# Patient Record
Sex: FEMALE | Race: WHITE | NOT HISPANIC OR LATINO
[De-identification: names, ages, dates, MRNs, and addresses within clinical notes are randomized per-mention and may not be internally consistent; named-entity substitution may affect disease eponyms.]

---

## 2020-08-31 PROBLEM — Z00.00 ENCOUNTER FOR PREVENTIVE HEALTH EXAMINATION: Status: ACTIVE | Noted: 2020-08-31

## 2020-09-09 ENCOUNTER — APPOINTMENT (OUTPATIENT)
Dept: NEUROSURGERY | Facility: CLINIC | Age: 85
End: 2020-09-09
Payer: MEDICARE

## 2020-09-09 VITALS
WEIGHT: 176 LBS | HEIGHT: 57 IN | BODY MASS INDEX: 37.97 KG/M2 | DIASTOLIC BLOOD PRESSURE: 67 MMHG | HEART RATE: 68 BPM | SYSTOLIC BLOOD PRESSURE: 168 MMHG

## 2020-09-09 VITALS — TEMPERATURE: 97.3 F

## 2020-09-09 PROCEDURE — 99203 OFFICE O/P NEW LOW 30 MIN: CPT

## 2020-09-09 RX ORDER — GABAPENTIN 300 MG/1
300 CAPSULE ORAL AT BEDTIME
Refills: 0 | Status: ACTIVE | COMMUNITY

## 2020-09-09 RX ORDER — MELOXICAM 15 MG/1
15 TABLET ORAL DAILY
Refills: 0 | Status: ACTIVE | COMMUNITY

## 2020-09-09 NOTE — ASSESSMENT
[FreeTextEntry1] : 87 year old female with low back pain and lumbar radicular pain not improved with PT and oral medication.  We will obtain MRI lumbar spine prior to any interventional procedure.  She will follow up with me once she has completed the study so that we may review the results and discuss her further treatment options.

## 2020-09-09 NOTE — HISTORY OF PRESENT ILLNESS
[Back] : back [___ yrs] : [unfilled] year(s) ago [10] : a maximum pain level of 10/10 [Sharp] : sharp [Right] : right [Buttocks] : buttocks [Bending] : bending [Sitting] : sitting [Standing] : standing [Ice] : ice [Gait Dysfunction] : gait dysfunction [PT] : PT [Medications] : medications [Injections] : injections [FreeTextEntry1] : pain worsening over the past 5-6 months [de-identified] : 5 [FreeTextEntry6] : Advil, Gabapentin, Meloxicam, had one injection (trigger point) which helped her for about 1 year

## 2020-09-09 NOTE — PHYSICAL EXAM
[General Appearance - Alert] : alert [Mood] : the mood was normal [Motor Strength] : muscle strength was normal in all four extremities [Sensation Tactile Decrease] : light touch was intact [2+] : Ankle jerk left 2+ [Straight-Leg Raise Test - Left] : straight leg raise of the left leg was negative [Able to heel walk] : the patient was able to heel walk [Able to toe walk] : the patient was able to toe walk [Straight-Leg Raise Test - Right] : straight leg raise  of the right leg was negative [No Spinal Tenderness] : no spinal tenderness [] : no rash

## 2020-09-11 RX ORDER — DIAZEPAM 5 MG/1
5 TABLET ORAL
Qty: 4 | Refills: 0 | Status: ACTIVE | COMMUNITY
Start: 2020-09-11 | End: 1900-01-01

## 2020-09-23 ENCOUNTER — APPOINTMENT (OUTPATIENT)
Dept: MRI IMAGING | Facility: CLINIC | Age: 85
End: 2020-09-23
Payer: MEDICARE

## 2020-09-23 ENCOUNTER — RESULT REVIEW (OUTPATIENT)
Age: 85
End: 2020-09-23

## 2020-09-23 ENCOUNTER — OUTPATIENT (OUTPATIENT)
Dept: OUTPATIENT SERVICES | Facility: HOSPITAL | Age: 85
LOS: 1 days | End: 2020-09-23
Payer: MEDICARE

## 2020-09-23 DIAGNOSIS — M51.36 OTHER INTERVERTEBRAL DISC DEGENERATION, LUMBAR REGION: ICD-10-CM

## 2020-09-23 DIAGNOSIS — M54.16 RADICULOPATHY, LUMBAR REGION: ICD-10-CM

## 2020-09-23 DIAGNOSIS — M54.5 LOW BACK PAIN: ICD-10-CM

## 2020-09-23 DIAGNOSIS — M48.061 SPINAL STENOSIS, LUMBAR REGION WITHOUT NEUROGENIC CLAUDICATION: ICD-10-CM

## 2020-09-23 PROCEDURE — 72148 MRI LUMBAR SPINE W/O DYE: CPT | Mod: 26

## 2020-09-30 ENCOUNTER — APPOINTMENT (OUTPATIENT)
Dept: NEUROSURGERY | Facility: CLINIC | Age: 85
End: 2020-09-30
Payer: MEDICARE

## 2020-09-30 VITALS
SYSTOLIC BLOOD PRESSURE: 169 MMHG | HEIGHT: 57 IN | WEIGHT: 176 LBS | HEART RATE: 74 BPM | DIASTOLIC BLOOD PRESSURE: 66 MMHG | BODY MASS INDEX: 37.97 KG/M2

## 2020-09-30 VITALS — TEMPERATURE: 97.8 F

## 2020-09-30 PROCEDURE — 99213 OFFICE O/P EST LOW 20 MIN: CPT

## 2020-09-30 NOTE — REASON FOR VISIT
[Follow-Up: _____] : a [unfilled] follow-up visit [FreeTextEntry1] : Patient returns after completing MRI lumbar spine.  She continues to have significant lower back pain without radiation down her legs.

## 2020-09-30 NOTE — DATA REVIEWED
[de-identified] : \par  MR Lumbar Spine No Cont             Final\par \par No Documents Attached\par \par \par \par \par   EXAM:  MR SPINE LUMBAR\par \par \par PROCEDURE DATE:  09/23/2020\par \par \par \par INTERPRETATION:  LUMBOSACRAL SPINE MRI\par \par CLINICAL INFORMATION: Low back pain and bilateral hip pain.\par TECHNIQUE: Multiplanar, multisequence MR imaging was obtained of the lumbosacral spine.\par FINDINGS:\par \par DISC LEVEL EVALUATION: Mild to moderate multilevel lower thoracic degenerative disc disease is present with mild disc bulging and osteophyte formation as well as disc degeneration.\par \par T12/L1: There is mild disc bulging and osteophyte formation with moderate facet arthrosis. Mild central canal stenosis is present with mild to moderate bilateral foraminal narrowing.\par L1/L2: There is mild disc height loss with mild to moderate circumferential disc bulging and osteophyte formation resulting in moderate to severe left and moderate right foraminal narrowing along with moderate central canal stenosis. There is moderate bilateral facet arthrosis.\par L2/L3: Trace anterolisthesis is noted of L3-L4 with moderate to severe facet arthrosis. There is mild disc bulging and osteophyte formation. The combination of these findings results in moderate to severe central canal stenosis with severe left and mild right foraminal narrowing.\par L3/L4: Mild to moderate circumferential disc bulging and osteophyte formation is present with moderate facet arthrosis resulting in moderate central canal stenosis with moderate to severe bilateral foraminal narrowing.\par L4/L5: Mild to moderate circumferential disc bulging and osteophyte formation is present with moderate facet arthrosis resulting in moderate to severe central canal stenosis with severe right and mild left foraminal narrowing.\par L5/S1: Severe bilateral facet arthrosis present with grade 1 anterolisthesis. There is uncovering of the posterior aspect of the disc along with mild to moderate disc bulging resulting in severe right and mild left foraminal narrowing. There is moderate to severe central canal stenosis.\par \par SPINAL ALIGNMENT: Mild to moderate scoliosis is present convex to the right.\par DISTAL CORD AND CONUS: The distal cord is normal in appearance. The conus terminates at L1 and is unremarkable.\par SI JOINTS: Mild degenerative changes is present.\par MARROW: Mild multilevel endplate marrow edema is present that is most prominent at L3-L4. There is no acute fracture. There is a mild to moderate chronic compression deformity of L1.\par PARASPINAL MUSCLE AND SOFT TISSUES: Fatty infiltration is noted of the lower lumbar paraspinal musculature\par INTRAABDOMINAL/INTRAPELVIC SOFT TISSUES: Normal\par \par IMPRESSION: Moderate to severe multilevel lumbar spondylosis with multilevel disc bulging, osteophyte formation and facet arthrosis resulting in advanced multilevel central canal and foraminal narrowing as detailed above.\par \par \par \par \par \par \par ANTONINA CALIXTO M.D., ATTENDING RADIOLOGIST Phone #: [phone]\par This document has been electronically signed. Sep 23 2020  2:19PM\par \par  \par \par  Ordered by: DIANA ADAM       Collected/Examined: 23Sep2020 11:52AM       \par Verified by: DIANA ADAM 24Sep2020 10:22AM       \par  Result Communication: Schedule appointment to discuss results;\par Stage: Final       \par  Performed at: [facility]       Resulted: 23Sep2020 02:22PM       Last Updated: 24Sep2020 10:22AM       Accession: [id number]

## 2020-09-30 NOTE — ASSESSMENT
[FreeTextEntry1] : 87 year old female with low back pain secondary to lumbar facet arthropathy.  Given the nature of the patient's complaints and lack of significant improvement following more conservative measures, we did discuss lumbar facet steroid injection.  Risks, benefits, and expectations of the procedure were reviewed.  The patient was provided with an educational pamphlet outlining the details of the procedure so that he/she may have the ability to review the information prior to proceeding.  The patient has agreed to proceed and will follow up with me for the procedure.\par

## 2020-10-05 ENCOUNTER — APPOINTMENT (OUTPATIENT)
Dept: DISASTER EMERGENCY | Facility: CLINIC | Age: 85
End: 2020-10-05

## 2020-10-08 DIAGNOSIS — Z01.818 ENCOUNTER FOR OTHER PREPROCEDURAL EXAMINATION: ICD-10-CM

## 2020-10-09 ENCOUNTER — APPOINTMENT (OUTPATIENT)
Dept: DISASTER EMERGENCY | Facility: CLINIC | Age: 85
End: 2020-10-09

## 2020-10-10 LAB — SARS-COV-2 N GENE NPH QL NAA+PROBE: NOT DETECTED

## 2020-10-11 ENCOUNTER — TRANSCRIPTION ENCOUNTER (OUTPATIENT)
Age: 85
End: 2020-10-11

## 2020-10-12 ENCOUNTER — OUTPATIENT (OUTPATIENT)
Dept: OUTPATIENT SERVICES | Facility: HOSPITAL | Age: 85
LOS: 1 days | End: 2020-10-12
Payer: MEDICARE

## 2020-10-12 ENCOUNTER — APPOINTMENT (OUTPATIENT)
Dept: NEUROSURGERY | Facility: CLINIC | Age: 85
End: 2020-10-12
Payer: MEDICARE

## 2020-10-12 DIAGNOSIS — M54.16 RADICULOPATHY, LUMBAR REGION: ICD-10-CM

## 2020-10-12 PROCEDURE — 64493 INJ PARAVERT F JNT L/S 1 LEV: CPT

## 2020-10-12 PROCEDURE — 64493 INJ PARAVERT F JNT L/S 1 LEV: CPT | Mod: 50

## 2020-10-12 PROCEDURE — 72148 MRI LUMBAR SPINE W/O DYE: CPT

## 2020-10-15 DIAGNOSIS — M47.816 SPONDYLOSIS WITHOUT MYELOPATHY OR RADICULOPATHY, LUMBAR REGION: ICD-10-CM

## 2020-10-27 ENCOUNTER — APPOINTMENT (OUTPATIENT)
Dept: NEUROSURGERY | Facility: CLINIC | Age: 85
End: 2020-10-27
Payer: MEDICARE

## 2020-10-27 VITALS
WEIGHT: 170 LBS | DIASTOLIC BLOOD PRESSURE: 69 MMHG | HEART RATE: 74 BPM | SYSTOLIC BLOOD PRESSURE: 146 MMHG | BODY MASS INDEX: 36.68 KG/M2 | HEIGHT: 57 IN

## 2020-10-27 PROCEDURE — 99213 OFFICE O/P EST LOW 20 MIN: CPT

## 2020-10-27 PROCEDURE — 99072 ADDL SUPL MATRL&STAF TM PHE: CPT

## 2020-10-27 NOTE — ASSESSMENT
[FreeTextEntry1] : 87 year old female with low back and lumbar facet arthropathy now moderately improved following her first facet injections.  She is agreeable to a repeat injection and will follow up with me next week for her procedure.

## 2020-10-27 NOTE — REASON FOR VISIT
[Follow-Up: _____] : a [unfilled] follow-up visit [FreeTextEntry1] : Patient returns after her first facet injections.  She feels 50% improved as compared to prior to her procedure.  She is here to discuss her progress.

## 2020-11-06 ENCOUNTER — APPOINTMENT (OUTPATIENT)
Dept: DISASTER EMERGENCY | Facility: CLINIC | Age: 85
End: 2020-11-06

## 2020-11-07 LAB — SARS-COV-2 N GENE NPH QL NAA+PROBE: NOT DETECTED

## 2020-11-08 ENCOUNTER — TRANSCRIPTION ENCOUNTER (OUTPATIENT)
Age: 85
End: 2020-11-08

## 2020-11-09 ENCOUNTER — APPOINTMENT (OUTPATIENT)
Dept: NEUROSURGERY | Facility: CLINIC | Age: 85
End: 2020-11-09
Payer: MEDICARE

## 2020-11-09 ENCOUNTER — OUTPATIENT (OUTPATIENT)
Dept: OUTPATIENT SERVICES | Facility: HOSPITAL | Age: 85
LOS: 1 days | End: 2020-11-09
Payer: MEDICARE

## 2020-11-09 DIAGNOSIS — M47.816 SPONDYLOSIS WITHOUT MYELOPATHY OR RADICULOPATHY, LUMBAR REGION: ICD-10-CM

## 2020-11-09 DIAGNOSIS — M54.16 RADICULOPATHY, LUMBAR REGION: ICD-10-CM

## 2020-11-09 PROCEDURE — 64493 INJ PARAVERT F JNT L/S 1 LEV: CPT

## 2020-11-09 PROCEDURE — 64493 INJ PARAVERT F JNT L/S 1 LEV: CPT | Mod: 50

## 2020-12-23 ENCOUNTER — APPOINTMENT (OUTPATIENT)
Dept: NEUROSURGERY | Facility: CLINIC | Age: 85
End: 2020-12-23
Payer: MEDICARE

## 2020-12-23 VITALS — TEMPERATURE: 96.7 F

## 2020-12-23 DIAGNOSIS — M54.5 LOW BACK PAIN: ICD-10-CM

## 2020-12-23 DIAGNOSIS — M54.16 RADICULOPATHY, LUMBAR REGION: ICD-10-CM

## 2020-12-23 DIAGNOSIS — M47.816 SPONDYLOSIS W/OUT MYELOPATHY OR RADICULOPATHY, LUMBAR REGION: ICD-10-CM

## 2020-12-23 DIAGNOSIS — G89.29 LOW BACK PAIN: ICD-10-CM

## 2020-12-23 DIAGNOSIS — M51.36 OTHER INTERVERTEBRAL DISC DEGENERATION, LUMBAR REGION: ICD-10-CM

## 2020-12-23 DIAGNOSIS — M48.061 SPINAL STENOSIS, LUMBAR REGION WITHOUT NEUROGENIC CLAUDICATION: ICD-10-CM

## 2020-12-23 PROCEDURE — 99213 OFFICE O/P EST LOW 20 MIN: CPT

## 2020-12-23 PROCEDURE — 99072 ADDL SUPL MATRL&STAF TM PHE: CPT

## 2020-12-23 NOTE — ASSESSMENT
[FreeTextEntry1] : 87 year old female with low back pain and lumbar facet arthropathy.  She is agreeable to bilateral L5-S1 medial branch blocks followed by radiofrequency ablation.  She will follow up with me in 2 weeks for her procedure.

## 2020-12-23 NOTE — REASON FOR VISIT
[Follow-Up: _____] : a [unfilled] follow-up visit [FreeTextEntry1] : Patient returns after her second facet injections over 1 month ago.  She unfortunately does not feel any additional relief from this procedure.  She continues to have significant lower back pain.  She is here to discuss.

## 2021-01-04 ENCOUNTER — APPOINTMENT (OUTPATIENT)
Dept: DISASTER EMERGENCY | Facility: CLINIC | Age: 86
End: 2021-01-04

## 2021-01-05 LAB — SARS-COV-2 N GENE NPH QL NAA+PROBE: DETECTED

## 2021-01-07 ENCOUNTER — APPOINTMENT (OUTPATIENT)
Dept: NEUROSURGERY | Facility: CLINIC | Age: 86
End: 2021-01-07

## 2021-01-21 ENCOUNTER — APPOINTMENT (OUTPATIENT)
Dept: NEUROSURGERY | Facility: CLINIC | Age: 86
End: 2021-01-21

## 2021-01-25 ENCOUNTER — APPOINTMENT (OUTPATIENT)
Dept: DISASTER EMERGENCY | Facility: CLINIC | Age: 86
End: 2021-01-25

## 2021-01-26 LAB — SARS-COV-2 N GENE NPH QL NAA+PROBE: DETECTED

## 2021-01-27 ENCOUNTER — TRANSCRIPTION ENCOUNTER (OUTPATIENT)
Age: 86
End: 2021-01-27

## 2021-02-04 ENCOUNTER — APPOINTMENT (OUTPATIENT)
Dept: NEUROSURGERY | Facility: CLINIC | Age: 86
End: 2021-02-04

## 2021-04-05 ENCOUNTER — APPOINTMENT (OUTPATIENT)
Dept: DISASTER EMERGENCY | Facility: CLINIC | Age: 86
End: 2021-04-05

## 2021-04-06 LAB — SARS-COV-2 N GENE NPH QL NAA+PROBE: NOT DETECTED

## 2021-04-07 ENCOUNTER — TRANSCRIPTION ENCOUNTER (OUTPATIENT)
Age: 86
End: 2021-04-07

## 2021-04-08 ENCOUNTER — APPOINTMENT (OUTPATIENT)
Dept: NEUROSURGERY | Facility: CLINIC | Age: 86
End: 2021-04-08
Payer: MEDICARE

## 2021-04-08 ENCOUNTER — OUTPATIENT (OUTPATIENT)
Dept: OUTPATIENT SERVICES | Facility: HOSPITAL | Age: 86
LOS: 1 days | End: 2021-04-08
Payer: MEDICARE

## 2021-04-08 DIAGNOSIS — M54.16 RADICULOPATHY, LUMBAR REGION: ICD-10-CM

## 2021-04-08 PROCEDURE — 64493 INJ PARAVERT F JNT L/S 1 LEV: CPT

## 2021-04-08 PROCEDURE — 64494 INJ PARAVERT F JNT L/S 2 LEV: CPT

## 2021-04-08 PROCEDURE — 64494 INJ PARAVERT F JNT L/S 2 LEV: CPT | Mod: 50

## 2021-04-08 PROCEDURE — 64493 INJ PARAVERT F JNT L/S 1 LEV: CPT | Mod: 50

## 2021-04-09 DIAGNOSIS — M47.816 SPONDYLOSIS WITHOUT MYELOPATHY OR RADICULOPATHY, LUMBAR REGION: ICD-10-CM

## 2021-04-12 ENCOUNTER — LABORATORY RESULT (OUTPATIENT)
Age: 86
End: 2021-04-12

## 2021-04-12 ENCOUNTER — APPOINTMENT (OUTPATIENT)
Dept: DISASTER EMERGENCY | Facility: CLINIC | Age: 86
End: 2021-04-12

## 2021-04-14 ENCOUNTER — TRANSCRIPTION ENCOUNTER (OUTPATIENT)
Age: 86
End: 2021-04-14

## 2021-04-15 ENCOUNTER — OUTPATIENT (OUTPATIENT)
Dept: OUTPATIENT SERVICES | Facility: HOSPITAL | Age: 86
LOS: 1 days | End: 2021-04-15
Payer: MEDICARE

## 2021-04-15 ENCOUNTER — APPOINTMENT (OUTPATIENT)
Dept: NEUROSURGERY | Facility: CLINIC | Age: 86
End: 2021-04-15
Payer: MEDICARE

## 2021-04-15 DIAGNOSIS — M54.16 RADICULOPATHY, LUMBAR REGION: ICD-10-CM

## 2021-04-15 PROCEDURE — 64494 INJ PARAVERT F JNT L/S 2 LEV: CPT | Mod: 50

## 2021-04-15 PROCEDURE — 64493 INJ PARAVERT F JNT L/S 1 LEV: CPT

## 2021-04-15 PROCEDURE — 64493 INJ PARAVERT F JNT L/S 1 LEV: CPT | Mod: 50

## 2021-04-15 PROCEDURE — 64494 INJ PARAVERT F JNT L/S 2 LEV: CPT

## 2021-04-19 ENCOUNTER — APPOINTMENT (OUTPATIENT)
Dept: DISASTER EMERGENCY | Facility: CLINIC | Age: 86
End: 2021-04-19

## 2021-04-20 DIAGNOSIS — M47.816 SPONDYLOSIS WITHOUT MYELOPATHY OR RADICULOPATHY, LUMBAR REGION: ICD-10-CM

## 2021-04-20 LAB — SARS-COV-2 N GENE NPH QL NAA+PROBE: NOT DETECTED

## 2021-04-21 ENCOUNTER — TRANSCRIPTION ENCOUNTER (OUTPATIENT)
Age: 86
End: 2021-04-21

## 2021-04-22 ENCOUNTER — OUTPATIENT (OUTPATIENT)
Dept: OUTPATIENT SERVICES | Facility: HOSPITAL | Age: 86
LOS: 1 days | End: 2021-04-22
Payer: MEDICARE

## 2021-04-22 ENCOUNTER — APPOINTMENT (OUTPATIENT)
Dept: NEUROSURGERY | Facility: CLINIC | Age: 86
End: 2021-04-22
Payer: MEDICARE

## 2021-04-22 DIAGNOSIS — M54.16 RADICULOPATHY, LUMBAR REGION: ICD-10-CM

## 2021-04-22 PROCEDURE — 64635 DESTROY LUMB/SAC FACET JNT: CPT

## 2021-04-22 PROCEDURE — 64636 DESTROY L/S FACET JNT ADDL: CPT

## 2021-04-26 DIAGNOSIS — M47.816 SPONDYLOSIS WITHOUT MYELOPATHY OR RADICULOPATHY, LUMBAR REGION: ICD-10-CM

## 2023-10-01 PROBLEM — M54.16 LUMBAR RADICULAR PAIN: Status: ACTIVE | Noted: 2020-09-09
